# Patient Record
(demographics unavailable — no encounter records)

---

## 2024-10-08 NOTE — REVIEW OF SYSTEMS
[Negative] : Heme/Lymph [de-identified] : as per HPI  [FreeTextEntry4] : as per HPI [FreeTextEntry2] : as per HPI

## 2024-10-08 NOTE — HISTORY OF PRESENT ILLNESS
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 Reports voice improvement after injections--wears off after about 3 months and struggles to voice.  Eating/drinking without any difficulty.  Patient states her jaw feels very tight and her mouth tends to hang open.  Last botox in office 2/21/2024, did great but wore off about 9-10 weeks after the injection. Mother reports frequent loud snoring and does not sleep well at night--denies pausing, gasping, choking or witnessed apneas. Takes naps during the day. Denies throat pain, dysphagia, odynophagia, hemoptysis, recent fevers or infections.

## 2024-10-08 NOTE — REASON FOR VISIT
[Subsequent Evaluation] : a subsequent evaluation for [Hoarseness/Dysphonia] : hoarseness/dysphonia [Patient] : patient [Father] : father [FreeTextEntry2] : laryngeal spasms.

## 2024-10-08 NOTE — CONSULT LETTER
[Dear  ___] : Dear  [unfilled], [Courtesy Letter:] : I had the pleasure of seeing your patient, [unfilled], in my office today. [Please see my note below.] : Please see my note below. [Consult Closing:] : Thank you very much for allowing me to participate in the care of this patient.  If you have any questions, please do not hesitate to contact me. [Sincerely,] : Sincerely, [DrHarjinder  ___] : Dr. YOUSSEF [FreeTextEntry2] : Lawrence Soto [FreeTextEntry3] : Darvin Hinojosa MD, PhD\par  Chief, Division of Laryngology\par  Department of Otolaryngology\par  [facility]\par  Pediatric Otolaryngology, [facility]\par   of Otolaryngology\par  [facility]

## 2024-10-08 NOTE — ADDENDUM
[FreeTextEntry1] : Documented by Clark Winters acting as scribe for Dr. Hinojosa on 06/11/2024. All Medical record entries made by the Scribe were at my, Dr. Hinojosa, direction and personally dictated by me on 06/11/2024. I have reviewed the chart and agree that the record accurately reflects my personal performance of the history, physical exam, assessment and plan. I have also personally directed, reviewed, and agreed with the discharge instructions.

## 2025-06-10 NOTE — REASON FOR VISIT
[Subsequent Evaluation] : a subsequent evaluation for [Hoarseness/Dysphonia] : hoarseness/dysphonia [Patient] : patient [Other: _____] : [unfilled] [FreeTextEntry2] : laryngeal spasms

## 2025-06-10 NOTE — CONSULT LETTER
[Dear  ___] : Dear  [unfilled], [Courtesy Letter:] : I had the pleasure of seeing your patient, [unfilled], in my office today. [Please see my note below.] : Please see my note below. [Consult Closing:] : Thank you very much for allowing me to participate in the care of this patient.  If you have any questions, please do not hesitate to contact me. [Sincerely,] : Sincerely, [DrHarijnder  ___] : Dr. YOUSSEF [FreeTextEntry2] : Lawrence Soto [FreeTextEntry3] : Darvin Hinojosa MD, PhD\par  Chief, Division of Laryngology\par  Department of Otolaryngology\par  [facility]\par  Pediatric Otolaryngology, [facility]\par   of Otolaryngology\par  [facility]

## 2025-06-10 NOTE — PHYSICAL EXAM
[Clear to Auscultation] : lungs were clear to auscultation bilaterally [Normal Gait and Station] : normal gait and station [Normal muscle strength, symmetry and tone of facial, head and neck musculature] : normal muscle strength, symmetry and tone of facial, head and neck musculature [Normal] : no obvious skin lesions [Exposed Vessel] : left anterior vessel not exposed [Wheezing] : no wheezing [Increased Work of Breathing] : no increased work of breathing with use of accessory muscles and retractions

## 2025-06-10 NOTE — ADDENDUM
[FreeTextEntry1] :  Documented by Alyssia Keene acting as scribe for Dr. Hinojosa on 06/10/2025. All Medical record entries made by the Scribe were at my, Dr. Hinojosa, direction and personally dictated by me on 06/10/2025 . I have reviewed the chart and agree that the record accurately reflects my personal performance of the history, physical exam, assessment and plan. I have also personally directed, reviewed, and agreed with the discharge instructions.

## 2025-06-10 NOTE — REVIEW OF SYSTEMS
[Negative] : Heme/Lymph [de-identified] : as per HPI  [FreeTextEntry4] : as per HPI [FreeTextEntry2] : as per HPI

## 2025-06-10 NOTE — HISTORY OF PRESENT ILLNESS
[de-identified] : 18 year old teenage girl with h/o of laryngeal spasms and dysphonia presents for follow-up s/p MDL with supraglottoplasty, bilateral therapeutic Botox injection into vocal folds, and bronch 6/23/2023 s/p in office Botox injections 11/01/23, 2/21/2023, 6/11/2024, 10/08/24 and 2/11/25 Presents for follow up evaluation. 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 Recently hospitalized at [place] for dystonia. Medication dosages increased upon discharge. Reports improvement within a few days after last injection. Results lasted about 10 weeks. Eating/drinking without any difficulty.  No longer taking reflux medication Denies recently treated injections. Patient previously complained of jaw tightness. When patient tries to close her mouth, she remarks on pain on the inside of her mouth

## 2025-07-24 NOTE — ADDENDUM
[FreeTextEntry1] : Documented by Meghan Wheeler acting as scribe for Dr. Hinojosa on 07/24/2025. All Medical record entries made by the Scribe were at my, Dr. Hinojosa, direction and personally dictated by me on 07/24/2025 . I have reviewed the chart and agree that the record accurately reflects my personal performance of the history, physical exam, assessment and plan. I have also personally directed, reviewed, and agreed with the discharge instructions.

## 2025-07-24 NOTE — REASON FOR VISIT
[Post-Operative Visit] : a post-operative visit [FreeTextEntry2] : Spasmodic dysphonia, oromandibular dystonia

## 2025-07-24 NOTE — HISTORY OF PRESENT ILLNESS
[de-identified] : 18 year old teenage girl with h/o Spasmodic dysphonia, oromandibular dystonia, difficulty with communication, inability to close mouth, severe dysphagia, sialorrhea, subjective shortness of breath, congenital dystonia with multiple  systemic manifestations, possible tracheobronchitis. H/o MDL with supraglottoplasty, bilateral therapeutic Botox injection into vocal folds, and bronch 6/23/2023 s/p in office Botox injections 11/01/23, 2/21/2023, 6/11/2024, 10/08/24 and 2/11/25 Presents today for follow up evaluation s/p Microsuspension direct laryngoscopy with Botox injection into bilateral vocal folds, rigid bronchoscopy, bilateral pterygoid muscle Botox injections 6/23/2025.  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 Eating/drinking without any difficulty. No longer taking reflux medication Denies recently treated injections. Patient previously complained of jaw tightness. When patient tries to close her mouth, she remarks on pain on the inside of her mouth.

## 2025-07-24 NOTE — CONSULT LETTER
[Dear  ___] : Dear  [unfilled], [Courtesy Letter:] : I had the pleasure of seeing your patient, [unfilled], in my office today. [Please see my note below.] : Please see my note below. [Consult Closing:] : Thank you very much for allowing me to participate in the care of this patient.  If you have any questions, please do not hesitate to contact me. [Sincerely,] : Sincerely, [DrHarjinder  ___] : Dr. YOUSSEF [FreeTextEntry3] : Darvin Hinojosa MD, PhD\par  Chief, Division of Laryngology\par  Department of Otolaryngology\par  [facility]\par  Pediatric Otolaryngology, [facility]\par   of Otolaryngology\par  [facility]

## 2025-07-24 NOTE — CONSULT LETTER
[Dear  ___] : Dear  [unfilled], [Courtesy Letter:] : I had the pleasure of seeing your patient, [unfilled], in my office today. [Please see my note below.] : Please see my note below. [Consult Closing:] : Thank you very much for allowing me to participate in the care of this patient.  If you have any questions, please do not hesitate to contact me. [Sincerely,] : Sincerely, [DrHarjinder  ___] : Dr. YOUSSEF [FreeTextEntry3] : Darvin Hinojosa MD, PhD\par  Chief, Division of Laryngology\par  Department of Otolaryngology\par  [facility]\par  Pediatric Otolaryngology, [facility]\par   of Otolaryngology\par  [facility]

## 2025-07-24 NOTE — HISTORY OF PRESENT ILLNESS
[de-identified] : 18 year old teenage girl with h/o Spasmodic dysphonia, oromandibular dystonia, difficulty with communication, inability to close mouth, severe dysphagia, sialorrhea, subjective shortness of breath, congenital dystonia with multiple  systemic manifestations, possible tracheobronchitis. H/o MDL with supraglottoplasty, bilateral therapeutic Botox injection into vocal folds, and bronch 6/23/2023 s/p in office Botox injections 11/01/23, 2/21/2023, 6/11/2024, 10/08/24 and 2/11/25 Presents today for follow up evaluation s/p Microsuspension direct laryngoscopy with Botox injection into bilateral vocal folds, rigid bronchoscopy, bilateral pterygoid muscle Botox injections 6/23/2025.  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 Eating/drinking without any difficulty. No longer taking reflux medication Denies recently treated injections. Patient previously complained of jaw tightness. When patient tries to close her mouth, she remarks on pain on the inside of her mouth.